# Patient Record
Sex: MALE | Race: WHITE | NOT HISPANIC OR LATINO | Employment: UNEMPLOYED | URBAN - METROPOLITAN AREA
[De-identification: names, ages, dates, MRNs, and addresses within clinical notes are randomized per-mention and may not be internally consistent; named-entity substitution may affect disease eponyms.]

---
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
## 2023-02-16 ENCOUNTER — TELEPHONE (OUTPATIENT)
Dept: PEDIATRICS CLINIC | Facility: CLINIC | Age: 18
End: 2023-02-16

## 2023-02-16 NOTE — TELEPHONE ENCOUNTER
Last visit with our office was 8/2021  He was instructed at that time to follow up for pe in 2022 with adult provider  Please remove Dr Madie Ho from PCP field

## 2023-09-18 ENCOUNTER — OFFICE VISIT (OUTPATIENT)
Dept: FAMILY MEDICINE CLINIC | Facility: CLINIC | Age: 18
End: 2023-09-18
Payer: COMMERCIAL

## 2023-09-18 VITALS
HEIGHT: 65 IN | SYSTOLIC BLOOD PRESSURE: 128 MMHG | TEMPERATURE: 98.1 F | WEIGHT: 159 LBS | BODY MASS INDEX: 26.49 KG/M2 | OXYGEN SATURATION: 99 % | DIASTOLIC BLOOD PRESSURE: 90 MMHG | HEART RATE: 88 BPM

## 2023-09-18 DIAGNOSIS — Z13.1 SCREENING FOR DIABETES MELLITUS: ICD-10-CM

## 2023-09-18 DIAGNOSIS — K21.9 GASTROESOPHAGEAL REFLUX DISEASE: ICD-10-CM

## 2023-09-18 DIAGNOSIS — Z13.220 NEED FOR LIPID SCREENING: ICD-10-CM

## 2023-09-18 DIAGNOSIS — Z00.00 HEALTH MAINTENANCE EXAMINATION: Primary | ICD-10-CM

## 2023-09-18 DIAGNOSIS — J30.9 ALLERGIC RHINITIS, UNSPECIFIED SEASONALITY, UNSPECIFIED TRIGGER: ICD-10-CM

## 2023-09-18 PROCEDURE — 99385 PREV VISIT NEW AGE 18-39: CPT | Performed by: FAMILY MEDICINE

## 2023-09-18 RX ORDER — FLUTICASONE PROPIONATE 50 MCG
2 SPRAY, SUSPENSION (ML) NASAL DAILY
Qty: 16 G | Refills: 2 | Status: SHIPPED | OUTPATIENT
Start: 2023-09-18

## 2023-09-18 RX ORDER — FAMOTIDINE 20 MG/1
20 TABLET, FILM COATED ORAL 2 TIMES DAILY
Qty: 60 TABLET | Refills: 2 | Status: SHIPPED | OUTPATIENT
Start: 2023-09-18

## 2023-09-18 NOTE — PROGRESS NOTES
Name: Itzel Parks      : 2005      MRN: [id number]  Encounter Provider: Abimael Kerr MD  Encounter Date: 2023   Encounter department: [address] [place] [address]     1. Health maintenance examination  Normal exam    2. Screening for diabetes mellitus  -     Comprehensive metabolic panel; Future    3. Need for lipid screening  -     Lipid panel; Future    4. Allergic rhinitis, unspecified seasonality, unspecified trigger  -     fluticasone (FLONASE) 50 mcg/act nasal spray; 2 sprays into each nostril daily    5. Gastroesophageal reflux disease  -     famotidine (PEPCID) 20 mg tablet; Take 1 tablet (20 mg total) by mouth 2 (two) times a day    Follow up in 1 year       Subjective     Patient is here to establish care. Has a Hx of rhinitis and takes flonase. Also has acid reflux and takes pepcid daily. Review of Systems   Constitutional: Negative for activity change, appetite change, fatigue and fever. HENT: Negative for congestion and ear discharge. Respiratory: Negative for cough and shortness of breath. Cardiovascular: Negative for chest pain and palpitations. Gastrointestinal: Negative for diarrhea and nausea. Musculoskeletal: Negative for arthralgias and back pain. Skin: Negative for color change and rash. Neurological: Negative for dizziness and headaches. Psychiatric/Behavioral: Negative for agitation and behavioral problems. Past Medical History:   Diagnosis Date   • Reflux esophagitis 2020     Past Surgical History:   Procedure Laterality Date   • [place] EGD TRANSORAL BIOPSY SINGLE/MULTIPLE N/A 2016    Procedure: ESOPHAGOGASTRODUODENOSCOPY (EGD); Surgeon: Laura Mclain MD;  Location: [facility];   Service: Pediatric Gastrointestinal   • TESTICLE SURGERY       Family History   Problem Relation Age of Onset   • No Known Problems Sister    • No Known Problems Father    • Cancer Other    • Arthritis Other      Social History Socioeconomic History   • Marital status: Single     Spouse name: None   • Number of children: None   • Years of education: None   • Highest education level: None   Occupational History   • None   Tobacco Use   • Smoking status: Never   • Smokeless tobacco: Never   • Tobacco comments:     No tobacco/smoke exposure   Vaping Use   • Vaping Use: Never used   Substance and Sexual Activity   • Alcohol use: No   • Drug use: No   • Sexual activity: Never   Other Topics Concern   • None   Social History Narrative    Lives with mom, dad and younger sister    No tobacco/smoke exposure    Guns in home locked up    Smoke and Carbon monoxide detectors in home.     Pets in home: 2 cats, 4 dogs, 2 ferrets, 2 rabbits, fish    School: graduated from [employer], spring 2021; works in [employer] at [id number] Strain: Not on [employer] Insecurity: Not on file   Transportation Needs: Not on file   Physical Activity: Not on file   Stress: Not on file   Social Connections: Not on file   Intimate Partner Violence: Not on file   Housing Stability: Not on file     Current Outpatient Medications on File Prior to Visit   Medication Sig   • [DISCONTINUED] aluminum chloride (DRYSOL) 20 % external solution Apply topically daily at bedtime (Patient not taking: Reported on 11/17/2022)   • [DISCONTINUED] doxycycline (ORACEA) 40 MG capsule Take 1 capsule (40 mg total) by mouth every morning (Patient not taking: Reported on 11/17/2022)   • [DISCONTINUED] famotidine (PEPCID) 20 mg tablet TAKE ONE TABLET BY MOUTH TWICE DAILY    • [DISCONTINUED] fluticasone (FLONASE) 50 mcg/act nasal spray USE TWO SPRAYS IN EACH NOSTRIL DAILY    • [DISCONTINUED] ketorolac (ACULAR) 0.5 % ophthalmic solution Administer 1 drop to the right eye 4 (four) times a day as needed (pain)   • [DISCONTINUED] olopatadine (PATANOL) 0.1 % ophthalmic solution Administer 1 drop to the right eye 2 (two) times a day   • [DISCONTINUED] tretinoin (REFISSA) 0.05 % cream      Allergies   Allergen Reactions   • Penicillins Hives   • Amoxicillin-Pot Clavulanate      Immunization History   Administered Date(s) Administered   • DTP 2005, 2005, 02/25/2004, 12/04/2004, 09/28/2007   • HPV9 09/10/2015, 11/11/2015, 03/11/2016   • Hep B, adult 2003, 2003, 02/25/2004   • Hib (PRP-OMP) 2003, 2003, 02/25/2004, 12/04/2004   • INFLUENZA 07/29/2011   • IPV 2005, 2003, 09/28/2007   • Influenza Quadrivalent Preservative Free 3 years and older IM 09/10/2015, 10/03/2016, 11/07/2017   • Influenza, injectable, quadrivalent, preservative free 0.5 mL 10/27/2018, 10/18/2019, 10/12/2020   • Influenza, nasal 09/17/2010, 01/09/2013, 09/06/2013, 09/08/2014   • MMR 09/02/2004, 09/28/2007   • Meningococcal B, Recombinant (TRUMENBA) 10/12/2020   • Meningococcal MCV4P 10/18/2019   • Meningococcal, Unknown Serogroups 09/08/2014   • Pneumococcal Conjugate PCV 7 2003, 2003, 02/25/2004, 12/04/2004   • Tdap 09/08/2014   • Varicella 09/02/2004, 09/28/2007       Objective     /90 (BP Location: Left arm, Patient Position: Sitting, Cuff Size: Adult)   Pulse 88   Temp 98.1 °F (36.7 °C)   Ht 5' 5" (1.651 m)   Wt 72.1 kg (159 lb)   SpO2 99%   BMI 26.46 kg/m²     Physical Exam  Constitutional:       General: He is not in acute distress. Appearance: He is well-developed. He is not diaphoretic. Eyes:      General: No scleral icterus. Pupils: Pupils are equal, round, and reactive to light. Cardiovascular:      Rate and Rhythm: Normal rate and regular rhythm. Heart sounds: Normal heart sounds. No murmur heard. Pulmonary:      Effort: Pulmonary effort is normal. No respiratory distress. Breath sounds: Normal breath sounds. No wheezing. Abdominal:      General: Bowel sounds are normal. There is no distension. Palpations: Abdomen is soft. Tenderness:  There is no abdominal tenderness. Skin:     General: Skin is warm and dry. Findings: No rash. Neurological:      Mental Status: He is alert and oriented to person, place, and time.        Fernanda Smith MD

## 2023-10-07 ENCOUNTER — LAB (OUTPATIENT)
Dept: LAB | Facility: CLINIC | Age: 18
End: 2023-10-07
Payer: COMMERCIAL

## 2023-10-07 DIAGNOSIS — Z13.1 SCREENING FOR DIABETES MELLITUS: ICD-10-CM

## 2023-10-07 DIAGNOSIS — Z13.220 NEED FOR LIPID SCREENING: ICD-10-CM

## 2023-10-07 LAB
ALBUMIN SERPL BCP-MCNC: 4.4 G/DL (ref 3.5–5)
ALP SERPL-CCNC: 63 U/L (ref 34–104)
ALT SERPL W P-5'-P-CCNC: 20 U/L (ref 7–52)
ANION GAP SERPL CALCULATED.3IONS-SCNC: 11 MMOL/L
AST SERPL W P-5'-P-CCNC: 16 U/L (ref 13–39)
BILIRUB SERPL-MCNC: 1.09 MG/DL (ref 0.2–1)
BUN SERPL-MCNC: 13 MG/DL (ref 5–25)
CALCIUM SERPL-MCNC: 9.2 MG/DL (ref 8.4–10.2)
CHLORIDE SERPL-SCNC: 104 MMOL/L (ref 96–108)
CHOLEST SERPL-MCNC: 153 MG/DL
CO2 SERPL-SCNC: 25 MMOL/L (ref 21–32)
CREAT SERPL-MCNC: 0.9 MG/DL (ref 0.6–1.3)
GFR SERPL CREATININE-BSD FRML MDRD: 122 ML/MIN/1.73SQ M
GLUCOSE P FAST SERPL-MCNC: 79 MG/DL (ref 65–99)
HDLC SERPL-MCNC: 46 MG/DL
LDLC SERPL CALC-MCNC: 91 MG/DL (ref 0–100)
NONHDLC SERPL-MCNC: 107 MG/DL
POTASSIUM SERPL-SCNC: 3.9 MMOL/L (ref 3.5–5.3)
PROT SERPL-MCNC: 6.9 G/DL (ref 6.4–8.4)
SODIUM SERPL-SCNC: 140 MMOL/L (ref 135–147)
TRIGL SERPL-MCNC: 78 MG/DL

## 2023-10-07 PROCEDURE — 80053 COMPREHEN METABOLIC PANEL: CPT

## 2023-10-07 PROCEDURE — 80061 LIPID PANEL: CPT

## 2023-10-07 PROCEDURE — 36415 COLL VENOUS BLD VENIPUNCTURE: CPT

## 2023-11-17 PROBLEM — Z00.00 HEALTH MAINTENANCE EXAMINATION: Status: RESOLVED | Noted: 2023-09-18 | Resolved: 2023-11-17

## 2024-07-26 ENCOUNTER — OFFICE VISIT (OUTPATIENT)
Dept: URGENT CARE | Facility: CLINIC | Age: 19
End: 2024-07-26
Payer: COMMERCIAL

## 2024-07-26 VITALS
HEIGHT: 65 IN | RESPIRATION RATE: 20 BRPM | TEMPERATURE: 98 F | DIASTOLIC BLOOD PRESSURE: 85 MMHG | HEART RATE: 97 BPM | SYSTOLIC BLOOD PRESSURE: 139 MMHG | BODY MASS INDEX: 26.76 KG/M2 | WEIGHT: 160.6 LBS | OXYGEN SATURATION: 97 %

## 2024-07-26 DIAGNOSIS — H92.02 ACUTE OTALGIA, LEFT: ICD-10-CM

## 2024-07-26 DIAGNOSIS — H65.03 BILATERAL ACUTE SEROUS OTITIS MEDIA, RECURRENCE NOT SPECIFIED: Primary | ICD-10-CM

## 2024-07-26 PROCEDURE — 99214 OFFICE O/P EST MOD 30 MIN: CPT | Performed by: NURSE PRACTITIONER

## 2024-07-26 RX ORDER — CEFDINIR 300 MG/1
300 CAPSULE ORAL EVERY 12 HOURS SCHEDULED
Qty: 20 CAPSULE | Refills: 0 | Status: SHIPPED | OUTPATIENT
Start: 2024-07-26 | End: 2024-08-05

## 2024-07-26 NOTE — PROGRESS NOTES
[facility]        NAME: Maurisio Ramey is a 18 y.o. male  : 2005    MRN: [id number]  DATE: 2024  TIME: 5:03 PM    Assessment and Plan   Bilateral acute serous otitis media, recurrence not specified [H65.03]  1. Bilateral acute serous otitis media, recurrence not specified  cefdinir (OMNICEF) 300 mg capsule      2. Acute otalgia, left  cefdinir (OMNICEF) 300 mg capsule            Patient Instructions       Follow up with PCP in 3-5 days.  Proceed to  ER if symptoms worsen.    If tests have been performed at [facility], our office will contact you with results if changes need to be made to the care plan discussed with you at the visit.  You can review your full results on [facility].  You have been prescribed cefdinir.  Take all medication as prescribed.  If you develop a rash - stop the medication and call your PCP. Take tylenol or motrin for pain.  Follow up with your PCP in 3-5 days  Go to the ED if symptoms worsen         Chief Complaint     Chief Complaint   Patient presents with    Earache     Left sided ear pain since last night. Recently sick this past week with upper respiratory congestion and fever, now mostly resolved.          History of Present Illness       This is a 18 year old male who states was ill last week with cold symptoms, fever of 102 and now has left ear pain. States had been taking dayquil. PMH is listed.   States the ear is painful and muffled.       Earache         Review of Systems   Review of Systems   Constitutional: Negative.    HENT:  Positive for ear pain.    Eyes: Negative.    Respiratory: Negative.     Cardiovascular: Negative.    Gastrointestinal: Negative.    Endocrine: Negative.    Genitourinary: Negative.    Musculoskeletal: Negative.    Skin: Negative.    Allergic/Immunologic: Negative.    Neurological: Negative.    Hematological: Negative.    Psychiatric/Behavioral: Negative.           Current Medications       Current Outpatient  "Medications:     cefdinir (OMNICEF) 300 mg capsule, Take 1 capsule (300 mg total) by mouth every 12 (twelve) hours for 10 days, Disp: 20 capsule, Rfl: 0    famotidine (PEPCID) 20 mg tablet, Take 1 tablet (20 mg total) by mouth 2 (two) times a day, Disp: 60 tablet, Rfl: 2    fluticasone (FLONASE) 50 mcg/act nasal spray, 2 sprays into each nostril daily, Disp: 16 g, Rfl: 2    Current Allergies     Allergies as of 07/26/2024 - Reviewed 07/26/2024   Allergen Reaction Noted    Penicillins Hives 09/08/2014    Amoxicillin-pot clavulanate  09/08/2014            The following portions of the patient's history were reviewed and updated as appropriate: allergies, current medications, past family history, past medical history, past social history, past surgical history and problem list.     Past Medical History:   Diagnosis Date    Reflux esophagitis 01/14/2020       Past Surgical History:   Procedure Laterality Date    [place] EGD TRANSORAL BIOPSY SINGLE/MULTIPLE N/A 2/9/2016    Procedure: ESOPHAGOGASTRODUODENOSCOPY (EGD);  Surgeon: Weston Ferreira MD;  Location: [facility];  Service: Pediatric Gastrointestinal    TESTICLE SURGERY         Family History   Problem Relation Age of Onset    No Known Problems Sister     No Known Problems Father     Cancer Other     Arthritis Other          Medications have been verified.        Objective   /85   Pulse 97   Temp 98 °F (36.7 °C)   Resp 20   Ht 5' 5\" (1.651 m)   Wt 72.8 kg (160 lb 9.6 oz)   SpO2 97%   BMI 26.73 kg/m²   No LMP for male patient.       Physical Exam     Physical Exam  Vitals and nursing note reviewed.   Constitutional:       General: He is not in acute distress.     Appearance: Normal appearance. He is normal weight. He is not ill-appearing, toxic-appearing or diaphoretic.   HENT:      Head: Normocephalic and atraumatic.      Right Ear: Tympanic membrane is injected, erythematous and bulging.      Left Ear: Tympanic membrane is injected, erythematous and bulging. "      Ears:      Comments: Cerumen in right ear canal      Nose: Nose normal. No congestion or rhinorrhea.      Mouth/Throat:      Mouth: Mucous membranes are moist.      Pharynx: No oropharyngeal exudate or posterior oropharyngeal erythema.   Eyes:      Extraocular Movements: Extraocular movements intact.   Cardiovascular:      Rate and Rhythm: Normal rate and regular rhythm.      Pulses: Normal pulses.      Heart sounds: Normal heart sounds. No murmur heard.  Pulmonary:      Effort: Pulmonary effort is normal. No respiratory distress.      Breath sounds: Normal breath sounds. No stridor. No wheezing, rhonchi or rales.   Chest:      Chest wall: No tenderness.   Abdominal:      General: There is no distension.      Palpations: Abdomen is soft.      Tenderness: There is no abdominal tenderness.   Musculoskeletal:         General: Normal range of motion.      Cervical back: Normal range of motion and neck supple.   Skin:     General: Skin is warm and dry.      Capillary Refill: Capillary refill takes less than 2 seconds.   Neurological:      General: No focal deficit present.      Mental Status: He is alert and oriented to person, place, and time.   Psychiatric:         Mood and Affect: Mood normal.         Behavior: Behavior normal.         Thought Content: Thought content normal.         Judgment: Judgment normal.

## 2024-07-26 NOTE — PATIENT INSTRUCTIONS
You have been prescribed cefdinir.  Take all medication as prescribed.  If you develop a rash - stop the medication and call your PCP. Take tylenol or motrin for pain.  Follow up with your PCP in 3-5 days  Go to the ED if symptoms worsen
